# Patient Record
Sex: FEMALE | Race: ASIAN | Employment: FULL TIME | URBAN - METROPOLITAN AREA
[De-identification: names, ages, dates, MRNs, and addresses within clinical notes are randomized per-mention and may not be internally consistent; named-entity substitution may affect disease eponyms.]

---

## 2021-05-07 ENCOUNTER — IMMUNIZATION (OUTPATIENT)
Dept: NURSING | Facility: CLINIC | Age: 46
End: 2021-05-07
Payer: MEDICAID

## 2021-05-07 PROCEDURE — 0001A PR COVID VAC PFIZER DIL RECON 30 MCG/0.3 ML IM: CPT

## 2021-05-07 PROCEDURE — 91300 PR COVID VAC PFIZER DIL RECON 30 MCG/0.3 ML IM: CPT

## 2021-06-27 ENCOUNTER — HEALTH MAINTENANCE LETTER (OUTPATIENT)
Age: 46
End: 2021-06-27

## 2021-10-17 ENCOUNTER — HEALTH MAINTENANCE LETTER (OUTPATIENT)
Age: 46
End: 2021-10-17

## 2022-02-22 ENCOUNTER — APPOINTMENT (OUTPATIENT)
Dept: ULTRASOUND IMAGING | Facility: HOSPITAL | Age: 47
End: 2022-02-22
Attending: EMERGENCY MEDICINE
Payer: OTHER MISCELLANEOUS

## 2022-02-22 ENCOUNTER — APPOINTMENT (OUTPATIENT)
Dept: CT IMAGING | Facility: HOSPITAL | Age: 47
End: 2022-02-22
Attending: EMERGENCY MEDICINE
Payer: OTHER MISCELLANEOUS

## 2022-02-22 ENCOUNTER — HOSPITAL ENCOUNTER (EMERGENCY)
Facility: HOSPITAL | Age: 47
Discharge: HOME | End: 2022-02-22
Attending: EMERGENCY MEDICINE | Admitting: EMERGENCY MEDICINE
Payer: OTHER MISCELLANEOUS

## 2022-02-22 VITALS
WEIGHT: 150 LBS | BODY MASS INDEX: 27.6 KG/M2 | RESPIRATION RATE: 16 BRPM | HEIGHT: 62 IN | TEMPERATURE: 98.3 F | OXYGEN SATURATION: 100 % | HEART RATE: 84 BPM | DIASTOLIC BLOOD PRESSURE: 80 MMHG | SYSTOLIC BLOOD PRESSURE: 122 MMHG

## 2022-02-22 DIAGNOSIS — G44.319 ACUTE POST-TRAUMATIC HEADACHE, NOT INTRACTABLE: ICD-10-CM

## 2022-02-22 DIAGNOSIS — E07.9 THYROID MASS: ICD-10-CM
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
## 2022-02-22 LAB
ANION GAP SERPL CALCULATED.3IONS-SCNC: 9 MMOL/L (ref 5–18)
BUN SERPL-MCNC: 16 MG/DL (ref 8–22)
CALCIUM SERPL-MCNC: 9.3 MG/DL (ref 8.5–10.5)
CHLORIDE BLD-SCNC: 103 MMOL/L (ref 98–107)
CO2 SERPL-SCNC: 23 MMOL/L (ref 22–31)
CREAT SERPL-MCNC: 0.69 MG/DL (ref 0.6–1.1)
ERYTHROCYTE [DISTWIDTH] IN BLOOD BY AUTOMATED COUNT: 13 % (ref 10–15)
GFR SERPL CREATININE-BSD FRML MDRD: >90 ML/MIN/1.73M2
GLUCOSE BLD-MCNC: 92 MG/DL (ref 70–125)
HCT VFR BLD AUTO: 42.5 % (ref 35–47)
HGB BLD-MCNC: 14 G/DL (ref 11.7–15.7)
MCH RBC QN AUTO: 28.5 PG (ref 26.5–33)
MCHC RBC AUTO-ENTMCNC: 32.9 G/DL (ref 31.5–36.5)
MCV RBC AUTO: 86 FL (ref 78–100)
PLATELET # BLD AUTO: 277 10E3/UL (ref 150–450)
POTASSIUM BLD-SCNC: 4 MMOL/L (ref 3.5–5)
RBC # BLD AUTO: 4.92 10E6/UL (ref 3.8–5.2)
SODIUM SERPL-SCNC: 135 MMOL/L (ref 136–145)
T4 FREE SERPL-MCNC: 0.92 NG/DL (ref 0.7–1.8)
TSH SERPL DL<=0.005 MIU/L-ACNC: 0.21 UIU/ML (ref 0.3–5)
WBC # BLD AUTO: 7.1 10E3/UL (ref 4–11)

## 2022-02-22 PROCEDURE — 36415 COLL VENOUS BLD VENIPUNCTURE: CPT | Performed by: EMERGENCY MEDICINE

## 2022-02-22 PROCEDURE — 80048 BASIC METABOLIC PNL TOTAL CA: CPT | Performed by: EMERGENCY MEDICINE

## 2022-02-22 PROCEDURE — 99285 EMERGENCY DEPT VISIT HI MDM: CPT | Mod: 25

## 2022-02-22 PROCEDURE — 76536 US EXAM OF HEAD AND NECK: CPT

## 2022-02-22 PROCEDURE — 250N000013 HC RX MED GY IP 250 OP 250 PS 637: Performed by: EMERGENCY MEDICINE

## 2022-02-22 PROCEDURE — 70450 CT HEAD/BRAIN W/O DYE: CPT

## 2022-02-22 PROCEDURE — 84443 ASSAY THYROID STIM HORMONE: CPT | Performed by: EMERGENCY MEDICINE

## 2022-02-22 PROCEDURE — 84439 ASSAY OF FREE THYROXINE: CPT | Performed by: EMERGENCY MEDICINE

## 2022-02-22 PROCEDURE — 85027 COMPLETE CBC AUTOMATED: CPT | Performed by: EMERGENCY MEDICINE

## 2022-02-22 PROCEDURE — 72125 CT NECK SPINE W/O DYE: CPT

## 2022-02-22 RX ORDER — ACETAMINOPHEN 325 MG/1
975 TABLET ORAL ONCE
Status: COMPLETED | OUTPATIENT
Start: 2022-02-22 | End: 2022-02-22

## 2022-02-22 RX ADMIN — ACETAMINOPHEN 975 MG: 325 TABLET ORAL at 11:27

## 2022-02-22 ASSESSMENT — ENCOUNTER SYMPTOMS
CONFUSION: 0
WOUND: 0
NUMBNESS: 0
WEAKNESS: 0
COUGH: 0
SHORTNESS OF BREATH: 0
BACK PAIN: 1
HEADACHES: 1
DIARRHEA: 0
DYSURIA: 0
FEVER: 0
LIGHT-HEADEDNESS: 1
NAUSEA: 0
VOMITING: 0
ABDOMINAL PAIN: 0
NECK PAIN: 1

## 2022-02-22 NOTE — Clinical Note
Savana Woodward was seen and treated in our emergency department on 2/22/2022.  She may return to work on 02/23/2022.       If you have any questions or concerns, please don't hesitate to call.      Lary Block MD
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
## 2022-02-22 NOTE — DISCHARGE INSTRUCTIONS
You may have a concussion from your fall today.  Imaging does not show any other signs for brain injury or bleeding.  Concussions can take several weeks to months to go away.  Avoid any activities where you may bump your head again.      Make an appointment to see a surgeon for the thyroid mass.    Also make an appointment to see your PCP for this thyroid mass and they can follow your thyroid function tests. If you are not having improvement in your brain symptoms from your fall make an appointment to see primary care in about 1 week.    You can use Tylenol 650 mg every 4-6 hours if needed for headache or body aches from the fall.    Return to the ER with worse headache, confusion, worse neck pain, trouble swallowing or breathing or any other concerns.    Thank you for choosing [facility] Emergency Department.  It has been my pleasure caring for you today.     ~Dr. Mckayla MD

## 2022-02-22 NOTE — ED TRIAGE NOTES
"Pt slipped outside on ice, falling backwards and striking back of head, felt \"pressure\" in her head immediately. Pt felt kind stunned immediately and then got up, walked the length of several tennis courts. Pt c/o light headedness, left shoulder pain and low back stiffness. Pt is unsure if she passed out for a second or so. Pt drove herself here from work. Denies thinners  "
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
## 2022-02-28 ENCOUNTER — OFFICE VISIT (OUTPATIENT)
Dept: SURGERY | Facility: CLINIC | Age: 47
End: 2022-02-28
Attending: EMERGENCY MEDICINE
Payer: COMMERCIAL

## 2022-02-28 VITALS — DIASTOLIC BLOOD PRESSURE: 80 MMHG | SYSTOLIC BLOOD PRESSURE: 110 MMHG

## 2022-02-28 DIAGNOSIS — E07.9 THYROID MASS: Primary | ICD-10-CM

## 2022-02-28 PROCEDURE — 99204 OFFICE O/P NEW MOD 45 MIN: CPT | Performed by: SPECIALIST

## 2022-02-28 NOTE — PROGRESS NOTES
[facility] Surgery Consult    HPI:    46 year old year old female who I have been consulted by No Ref-Primary, Physician for evaluation of Consult (thyroid mass)  She has a thyroid mass was found with work-up for axial fall.  She sustained some trauma needed a CT scan of her neck to rule out injury to her neck and found this large tumor mass in her left thyroid lobe.  This is a 9 cm lesion has calcifications in the center of this I then did an ultrasound confirming this.  No other lymph nodes were noted but this is a very large mass encompassing really and all of them at whole left side cause displacement of her trachea and esophagus.  Here today for evaluation    Allergies:Amoxicillin    History reviewed. No pertinent past medical history.    History reviewed. No pertinent surgical history.    CURRENT MEDS:  No current outpatient medications on file.     No current facility-administered medications for this visit.       History reviewed. No pertinent family history.     reports that she has never smoked. She has never used smokeless tobacco. She reports current alcohol use.    Review of Systems:  Negative except recent fall work-up which was negative but unfortunately found this large neck mass she states she has had no heat or cold intolerances no real other symptomatology like tiredness weight loss or gain really she may be skin some weight over the years but nothing that she can attribute directly.  Works out on a regular basis very active and healthy person.Otherwise twelve system of review is negative.      OBJECTIVE:  Vitals:    02/28/22 0841   BP: 110/80     There is no height or weight on file to calculate BMI.    EXAM:  GENERAL: This is a well-developed 46 year old female who appears her stated age  HEAD: normocephalic  HEENT: Pupils equal and reactive bilaterally  MOUTH: mucus membranes intact  Neck: She is a large mass in the left side possibly from clavicle encompasses the whole left lobe you  can feel displacement of the trachea from the midline.  She has no palpable nodes both superior and inferior to this or supraclavicular clavicular.  CARDIAC: RRR without murmur  CHEST/LUNG:  Clear to auscultation  ABDOMEN: Soft, nontender, nondistended, no masses  EXTREMITIES: Grossly normal, warm,   NEUROLOGIC: Focally intact, nonfocal  INTEGUMENT: No open lesions or ulcers  VASCULAR: Pulses intact, symmetrical upper and lower extremities.        LABS:  Lab Results   Component Value Date    WBC 7.1 02/22/2022    HGB 14.0 02/22/2022    HCT 42.5 02/22/2022    MCV 86 02/22/2022     02/22/2022     INR/Prothrombin Time  @LABRCNTIP(NA,K,CL,co2,bun,creatinine,labglom,glucose,calcium)@  No results found for: HGBA1C  No results found for: ALT, AST, GGT, ALKPHOS, BILITOT     Images: Reviewed the CT scan ultrasound showing this large thyroid mass of the left lobe.    PACS Images     Show images for US Thyroid    Study Result    Narrative & Impression   EXAM: US THYROID  LOCATION: [facility]  DATE/TIME: 2/22/2022 12:48 PM     INDICATION: abnl thryoid on CT scan  COMPARISON: CT 02/22/2022  TECHNIQUE: Thyroid ultrasound.      FINDINGS:  RIGHT lobe: 5.1 x 1.2 x 1.3 cm. Homogeneous echotexture.  Isthmus: 3 mm.  LEFT lobe: 9.7 x 4.4 x 3.7 cm. Heterogenous echotexture.     NECK: No cervical lymphadenopathy.     NODULES:     Nodule 1: An 8.9 x 4.4 x 3.7 cm nodule in the mid left lobe.   Composition: Solid or almost completely solid, 2 points   Echogenicity: Hyperechoic or isoechoic, 1 point   Shape: Not taller than wide, 0 points   Margin: Extra-thyroidal extension, 3 points   Echogenic Foci: None, or large comet-tail artifacts, 0 points   Point Total: 4-6 points. TI-RADS 4. If 1.5 cm or larger, recommend FNA; if 1 cm or larger, follow up US (annually for 5 years).                                                                       IMPRESSION:  1.  An 8.9 cm TI-RADS 4 nodule in the mid left lobe.  Recommend biopsy.     Nodules are characterized per  ACR Thyroid Imaging, Reporting and Data System (TI-RADS): White Paper of the ACR TI-RADS Committee  Alvaro Torres. et al. Journal of the American College of Radiology 2017. Volume 14 (2017), Issue 5, 827-647       PACS Images     Show images for Cervical spine CT w/o contrast    Study Result    Narrative & Impression   EXAM: CT CERVICAL SPINE W/O CONTRAST  LOCATION: [facility]  DATE/TIME: 2/22/2022 11:24 AM     INDICATION: Neck pain after fall  COMPARISON: None.  TECHNIQUE: Routine CT Cervical Spine without IV contrast. Multiplanar reformats. Dose reduction techniques were used.     FINDINGS:  Mild reversal of the usual cervical lordosis. Cervical vertebral body heights preserved.  No acute cervical spine fracture. No prevertebral soft tissue swelling. Patent spinal canal and neural foramina. No significant degenerative change. Markedly   enlarged multinodular left thyroid measuring approximately 4.5 x 5 x 7 cm displaces the trachea to the right.                                                                      IMPRESSION:  1.  Reversal of the usual cervical lordosis is nonspecific and can be positional or seen with muscle spasm/soft tissue injury.  2.  No acute cervical spine fracture.  3.  Markedly enlarged left thyroid gland with mass effect on the trachea. Recommend follow-up thyroid ultrasound.           Assessment/Plan:   Left thyroid mass    Unfortunately this does not look good is very large mass with internal calcifications.  Ultrasound confirms an CT scan.  Plan to get a biopsy of this patient need surgery discussed with surgery today biopsy is a consideration could be could be just a lobectomy or not it may be possible at this time point when he does biopsy results before making plans to discuss with biopsy results presented with back.  Discussed surgery with patient with reviewed risk of risk of infection bleeding  nerve injury especially with current regimen of potential large tumor.  Also potential need for total thyroidectomy and the need for lifelong replacement therapy also discussed about calcium issues with parathyroids return to me the same or stop his neck parathyroids obviously will do surgery there is risk of injury to them.    No follow-ups on file.    Surya Alberto MD  General Surgery [phone]  Vascular Surgery [phone]

## 2022-02-28 NOTE — LETTER
2/28/2022         RE: Savana Woodward  [address] [facility] [address]        Dear Colleague,    Thank you for referring your patient, Savana Woodward, to the [facility] [place]. Please see a copy of my visit note below.        [facility] Surgery Consult    HPI:    46 year old year old female who I have been consulted by No Ref-Primary, Physician for evaluation of Consult (thyroid mass)  She has a thyroid mass was found with work-up for axial fall.  She sustained some trauma needed a CT scan of her neck to rule out injury to her neck and found this large tumor mass in her left thyroid lobe.  This is a 9 cm lesion has calcifications in the center of this I then did an ultrasound confirming this.  No other lymph nodes were noted but this is a very large mass encompassing really and all of them at whole left side cause displacement of her trachea and esophagus.  Here today for evaluation    Allergies:Amoxicillin    History reviewed. No pertinent past medical history.    History reviewed. No pertinent surgical history.    CURRENT MEDS:  No current outpatient medications on file.     No current facility-administered medications for this visit.       History reviewed. No pertinent family history.     reports that she has never smoked. She has never used smokeless tobacco. She reports current alcohol use.    Review of Systems:  Negative except recent fall work-up which was negative but unfortunately found this large neck mass she states she has had no heat or cold intolerances no real other symptomatology like tiredness weight loss or gain really she may be skin some weight over the years but nothing that she can attribute directly.  Works out on a regular basis very active and healthy person.Otherwise twelve system of review is negative.      OBJECTIVE:  Vitals:    02/28/22 0841   BP: 110/80     There is no height or weight on file to calculate  BMI.    EXAM:  GENERAL: This is a well-developed 46 year old female who appears her stated age  HEAD: normocephalic  HEENT: Pupils equal and reactive bilaterally  MOUTH: mucus membranes intact  Neck: She is a large mass in the left side possibly from clavicle encompasses the whole left lobe you can feel displacement of the trachea from the midline.  She has no palpable nodes both superior and inferior to this or supraclavicular clavicular.  CARDIAC: RRR without murmur  CHEST/LUNG:  Clear to auscultation  ABDOMEN: Soft, nontender, nondistended, no masses  EXTREMITIES: Grossly normal, warm,   NEUROLOGIC: Focally intact, nonfocal  INTEGUMENT: No open lesions or ulcers  VASCULAR: Pulses intact, symmetrical upper and lower extremities.        LABS:  Lab Results   Component Value Date    WBC 7.1 02/22/2022    HGB 14.0 02/22/2022    HCT 42.5 02/22/2022    MCV 86 02/22/2022     02/22/2022     INR/Prothrombin Time  @LABRCNTIP(NA,K,CL,co2,bun,creatinine,labglom,glucose,calcium)@  No results found for: HGBA1C  No results found for: ALT, AST, GGT, ALKPHOS, BILITOT     Images: Reviewed the CT scan ultrasound showing this large thyroid mass of the left lobe.    PACS Images     Show images for US Thyroid    Study Result    Narrative & Impression   EXAM: US THYROID  LOCATION: [facility]  DATE/TIME: 2/22/2022 12:48 PM     INDICATION: abnl thryoid on CT scan  COMPARISON: CT 02/22/2022  TECHNIQUE: Thyroid ultrasound.      FINDINGS:  RIGHT lobe: 5.1 x 1.2 x 1.3 cm. Homogeneous echotexture.  Isthmus: 3 mm.  LEFT lobe: 9.7 x 4.4 x 3.7 cm. Heterogenous echotexture.     NECK: No cervical lymphadenopathy.     NODULES:     Nodule 1: An 8.9 x 4.4 x 3.7 cm nodule in the mid left lobe.   Composition: Solid or almost completely solid, 2 points   Echogenicity: Hyperechoic or isoechoic, 1 point   Shape: Not taller than wide, 0 points   Margin: Extra-thyroidal extension, 3 points   Echogenic Foci: None, or large  comet-tail artifacts, 0 points   Point Total: 4-6 points. TI-RADS 4. If 1.5 cm or larger, recommend FNA; if 1 cm or larger, follow up US (annually for 5 years).                                                                       IMPRESSION:  1.  An 8.9 cm TI-RADS 4 nodule in the mid left lobe. Recommend biopsy.     Nodules are characterized per  ACR Thyroid Imaging, Reporting and Data System (TI-RADS): White Paper of the ACR TI-RADS Committee  Alvaro Torres et al. Journal of the American College of Radiology 2017. Volume 14 (2017), Issue 5, 555-142       PACS Images     Show images for Cervical spine CT w/o contrast    Study Result    Narrative & Impression   EXAM: CT CERVICAL SPINE W/O CONTRAST  LOCATION: [facility]  DATE/TIME: 2/22/2022 11:24 AM     INDICATION: Neck pain after fall  COMPARISON: None.  TECHNIQUE: Routine CT Cervical Spine without IV contrast. Multiplanar reformats. Dose reduction techniques were used.     FINDINGS:  Mild reversal of the usual cervical lordosis. Cervical vertebral body heights preserved.  No acute cervical spine fracture. No prevertebral soft tissue swelling. Patent spinal canal and neural foramina. No significant degenerative change. Markedly   enlarged multinodular left thyroid measuring approximately 4.5 x 5 x 7 cm displaces the trachea to the right.                                                                      IMPRESSION:  1.  Reversal of the usual cervical lordosis is nonspecific and can be positional or seen with muscle spasm/soft tissue injury.  2.  No acute cervical spine fracture.  3.  Markedly enlarged left thyroid gland with mass effect on the trachea. Recommend follow-up thyroid ultrasound.           Assessment/Plan:   Left thyroid mass    Unfortunately this does not look good is very large mass with internal calcifications.  Ultrasound confirms an CT scan.  Plan to get a biopsy of this patient need surgery discussed with surgery  today biopsy is a consideration could be could be just a lobectomy or not it may be possible at this time point when he does biopsy results before making plans to discuss with biopsy results presented with back.  Discussed surgery with patient with reviewed risk of risk of infection bleeding nerve injury especially with current regimen of potential large tumor.  Also potential need for total thyroidectomy and the need for lifelong replacement therapy also discussed about calcium issues with parathyroids return to me the same or stop his neck parathyroids obviously will do surgery there is risk of injury to them.    No follow-ups on file.    Surya Alberto MD  General Surgery [phone]  Vascular Surgery [phone]          Again, thank you for allowing me to participate in the care of your patient.        Sincerely,        Surya Alberto MD

## 2022-03-07 ENCOUNTER — HOSPITAL ENCOUNTER (OUTPATIENT)
Dept: ULTRASOUND IMAGING | Facility: HOSPITAL | Age: 47
Discharge: HOME | End: 2022-03-07
Attending: SPECIALIST | Admitting: SPECIALIST
Payer: COMMERCIAL

## 2022-03-07 DIAGNOSIS — E07.9 THYROID MASS: ICD-10-CM

## 2022-03-07 PROCEDURE — 272N000431 US BIOPSY THYROID FINE NEEDLE ASPIRATION

## 2022-03-07 PROCEDURE — 88305 TISSUE EXAM BY PATHOLOGIST: CPT | Mod: TC | Performed by: SPECIALIST

## 2022-03-09 LAB
PATH REPORT.COMMENTS IMP SPEC: NORMAL
PATH REPORT.COMMENTS IMP SPEC: NORMAL
PATH REPORT.FINAL DX SPEC: NORMAL
PATH REPORT.GROSS SPEC: NORMAL
PATH REPORT.MICROSCOPIC SPEC OTHER STN: NORMAL
PATH REPORT.RELEVANT HX SPEC: NORMAL

## 2022-03-09 PROCEDURE — 88172 CYTP DX EVAL FNA 1ST EA SITE: CPT | Mod: 26 | Performed by: PATHOLOGY

## 2022-03-09 PROCEDURE — 88305 TISSUE EXAM BY PATHOLOGIST: CPT | Mod: 26 | Performed by: PATHOLOGY

## 2022-03-09 PROCEDURE — 88173 CYTOPATH EVAL FNA REPORT: CPT | Mod: 26 | Performed by: PATHOLOGY

## 2022-07-24 ENCOUNTER — HEALTH MAINTENANCE LETTER (OUTPATIENT)
Age: 47
End: 2022-07-24

## 2022-10-03 ENCOUNTER — HEALTH MAINTENANCE LETTER (OUTPATIENT)
Age: 47
End: 2022-10-03

## 2023-08-12 ENCOUNTER — HEALTH MAINTENANCE LETTER (OUTPATIENT)
Age: 48
End: 2023-08-12

## 2024-10-05 ENCOUNTER — HEALTH MAINTENANCE LETTER (OUTPATIENT)
Age: 49
End: 2024-10-05